# Patient Record
Sex: FEMALE | Race: WHITE
[De-identification: names, ages, dates, MRNs, and addresses within clinical notes are randomized per-mention and may not be internally consistent; named-entity substitution may affect disease eponyms.]

---

## 2018-01-30 ENCOUNTER — HOSPITAL ENCOUNTER (EMERGENCY)
Dept: HOSPITAL 17 - PHEFT | Age: 73
Discharge: HOME | End: 2018-01-30
Payer: MEDICARE

## 2018-01-30 VITALS
RESPIRATION RATE: 18 BRPM | TEMPERATURE: 98.5 F | SYSTOLIC BLOOD PRESSURE: 150 MMHG | HEART RATE: 79 BPM | OXYGEN SATURATION: 97 % | DIASTOLIC BLOOD PRESSURE: 90 MMHG

## 2018-01-30 VITALS — DIASTOLIC BLOOD PRESSURE: 86 MMHG | SYSTOLIC BLOOD PRESSURE: 148 MMHG

## 2018-01-30 VITALS — HEIGHT: 62 IN | BODY MASS INDEX: 27.59 KG/M2 | WEIGHT: 149.91 LBS

## 2018-01-30 DIAGNOSIS — Z85.3: ICD-10-CM

## 2018-01-30 DIAGNOSIS — Z90.13: ICD-10-CM

## 2018-01-30 DIAGNOSIS — Z79.899: ICD-10-CM

## 2018-01-30 DIAGNOSIS — E78.00: ICD-10-CM

## 2018-01-30 DIAGNOSIS — J10.1: Primary | ICD-10-CM

## 2018-01-30 PROCEDURE — 71045 X-RAY EXAM CHEST 1 VIEW: CPT

## 2018-01-30 PROCEDURE — 94640 AIRWAY INHALATION TREATMENT: CPT

## 2018-01-30 PROCEDURE — 87804 INFLUENZA ASSAY W/OPTIC: CPT

## 2018-01-30 PROCEDURE — 94664 DEMO&/EVAL PT USE INHALER: CPT

## 2018-01-30 PROCEDURE — 99284 EMERGENCY DEPT VISIT MOD MDM: CPT

## 2018-01-30 RX ADMIN — IPRATROPIUM BROMIDE AND ALBUTEROL SULFATE SCH AMPULE: .5; 3 SOLUTION RESPIRATORY (INHALATION) at 12:49

## 2018-01-30 NOTE — PD
HPI


Chief Complaint:  Cold / Flu Symptoms


Time Seen by Provider:  12:23


Travel History


International Travel<30 days:  No


Contact w/Intl Traveler<30days:  No


Traveled to known affect area:  No





History of Present Illness


HPI


72-year-old female presents for evaluation.  For 2 days she has had cough, 

congestion.  The cough is occasionally productive with clear sputum production.

  She reports low-grade fever with a maximum temperature at home of 100.  

Denies any sick contacts, recent travel.  Denies ear pain, sore throat, rash, 

abdominal pain, nausea or vomiting.  She has no other complaints at this time.





PFSH


Past Medical History


High Cholesterol:  Yes


Chemotherapy:  Yes (LAST TREATMENT 2004)


Diminished Hearing:  No


Implanted Vascular Access Dvce:  Yes (rt chest)


Immunizations Current:  Yes


Tetanus Vaccination:  Unknown


Influenza Vaccination:  Yes


Pregnant?:  Not Pregnant


Menopausal:  Yes





Past Surgical History


Eye Surgery:  Yes


Other Surgery:  Yes (mastecomy)





Social History


Alcohol Use:  Yes (2xs week)


Tobacco Use:  No


Substance Use:  No





Allergies-Medications


(Allergen,Severity, Reaction):  


Coded Allergies:  


     latex (Verified  Allergy, Intermediate, HIVES, 1/30/18)


Reported Meds & Prescriptions





Reported Meds & Active Scripts


Active


Proair Hfa 8.5 GM Inh (Albuterol Sulfate) 90 Mcg/Act Aer 2 Puff INH Q4-6H PRN


     108 mcg/actuation


Prednisone 20 Mg Tab 20 Mg PO BID 5 Days


Tamiflu (Oseltamivir Phosphate) 75 Mg Cap 75 Mg PO BID 5 Days


Reported


Synthroid (Levothyroxine Sodium) 112 Mcg Tab 112 Mcg PO DAILY


Crestor (Rosuvastatin Calcium) 10 Mg Tab 10 Mg PO DAILY


Omeprazole 20 Mg Tab 20 Mg PO DAILY








Review of Systems


Except as stated in HPI:  all other systems reviewed are Neg





Physical Exam


Narrative


GENERAL: Well-developed well-nourished female in no acute distress


SKIN: Warm and dry.


HEAD: Atraumatic. Normocephalic. 


EYES: Pupils equal and round. No scleral icterus. No injection or drainage. 


ENT: No nasal bleeding or discharge.  Mucous membranes pink and moist.


NECK: Trachea midline. No JVD. 


CARDIOVASCULAR: Regular rate and rhythm.  No murmur appreciated.


RESPIRATORY: No accessory muscle use.  Scattered wheezing bilaterally.


GASTROINTESTINAL: Abdomen soft, non-tender, nondistended. Hepatic and splenic 

margins not palpable. 


MUSCULOSKELETAL: No obvious deformities. No clubbing.  No cyanosis.  No edema. 


NEUROLOGICAL: Awake and alert. No obvious cranial nerve deficits.  Motor 

grossly within normal limits. Normal speech.


PSYCHIATRIC: Appropriate mood and affect; insight and judgment normal.





Data


Data


Last Documented VS





Vital Signs








  Date Time  Temp Pulse Resp B/P (MAP) Pulse Ox O2 Delivery O2 Flow Rate FiO2


 


1/30/18 12:02 98.5 79 18 150/90 (110) 97   








Orders





 Orders


Influenzae A/B Antigen (1/30/18 12:28)


Chest, Single Ap (1/30/18 12:28)


Albuterol-Ipratropium Neb (Duoneb Neb) (1/30/18 12:30)


Ed Discharge Order (1/30/18 13:50)








[facility]


Medical Decision Making


Medical Screen Exam Complete:  Yes


Emergency Medical Condition:  Yes


Medical Record Reviewed:  Yes


Differential Diagnosis


Influenza, pneumonia, reactive airway disease, bronchitis


Narrative Course


72-year-old female with 2 days of cough, congestion, low-grade fever.  On 

examination she has scattered wheezing.  DuoNeb treatment has been ordered.  

Chest x-ray, influenza antigen has been ordered.


Chest x-ray is normal.  She has positive for influenza B.  She reports 

significant improvement in breathing with DuoNeb treatment.  Patient is being 

discharged with Tamiflu, albuterol, prednisone.





Diagnosis





 Primary Impression:  


 Influenza B





***Additional Instructions:  


Medication as prescribed.  Tylenol and Motrin for fever.  Stay well-hydrated 

and well-nourished.  Follow-up with primary care physician as needed and return 

for any emergent medical conditions.


***Med/Other Pt SpecificInfo:  Prescription(s) given


Scripts


Albuterol 8.5 GM Inh (Proair Hfa 8.5 GM Inh) 90 Mcg/Act Aer


2 PUFF INH Q4-6H Y for SHORTNESS OF BREATH, #1 INHALER 0 Refills


   108 mcg/actuation


   Prov: Billy Bonilla MD         1/30/18 


Prednisone (Prednisone) 20 Mg Tab


20 MG PO BID for 5 Days, #10 TAB 0 Refills


   Prov: Billy Bonilla MD         1/30/18 


Oseltamivir (Tamiflu) 75 Mg Cap


75 MG PO BID for Mgmt Viral Infection for 5 Days, #10 CAP 0 Refills


   Prov: Billy Bonilla MD         1/30/18


Disposition:  01 DISCHARGE HOME


Condition:  Stable











Mark,Adam P. PA Jan 30, 2018 12:30